# Patient Record
Sex: MALE | Race: WHITE | Employment: FULL TIME | URBAN - METROPOLITAN AREA
[De-identification: names, ages, dates, MRNs, and addresses within clinical notes are randomized per-mention and may not be internally consistent; named-entity substitution may affect disease eponyms.]

---

## 2019-02-28 ENCOUNTER — HOSPITAL ENCOUNTER (OUTPATIENT)
Dept: GENERAL RADIOLOGY | Age: 64
Discharge: HOME | End: 2019-03-02
Payer: COMMERCIAL

## 2019-02-28 ENCOUNTER — HOSPITAL ENCOUNTER (OUTPATIENT)
Age: 64
Discharge: HOME | End: 2019-03-02
Payer: COMMERCIAL

## 2019-02-28 DIAGNOSIS — R05.9 COUGH: ICD-10-CM

## 2019-02-28 DIAGNOSIS — R06.02 SHORTNESS OF BREATH: ICD-10-CM

## 2019-02-28 PROCEDURE — 71046 X-RAY EXAM CHEST 2 VIEWS: CPT

## 2020-03-04 LAB
AVERAGE GLUCOSE: 120
CHOLESTEROL, TOTAL: 171 MG/DL
CHOLESTEROL/HDL RATIO: 2.9
CREATININE: 1 MG/DL
HBA1C MFR BLD: 5.8 %
HDLC SERPL-MCNC: 59 MG/DL (ref 35–70)
LDL CHOLESTEROL CALCULATED: 100 MG/DL (ref 0–160)
POTASSIUM (K+): 4.3
PSA, ULTRASENSITIVE: 2.35
TRIGL SERPL-MCNC: 62 MG/DL
VLDLC SERPL CALC-MCNC: 12 MG/DL

## 2020-05-28 ENCOUNTER — HOSPITAL ENCOUNTER (OUTPATIENT)
Age: 65
Discharge: HOME | End: 2020-05-30
Payer: COMMERCIAL

## 2020-05-28 PROCEDURE — 86618 LYME DISEASE ANTIBODY: CPT

## 2020-06-02 LAB — LYME, EIA: 1 LIV (ref 0–1.2)

## 2020-06-15 VITALS
RESPIRATION RATE: 14 BRPM | DIASTOLIC BLOOD PRESSURE: 74 MMHG | WEIGHT: 163 LBS | HEART RATE: 58 BPM | TEMPERATURE: 97.3 F | SYSTOLIC BLOOD PRESSURE: 110 MMHG

## 2020-06-15 RX ORDER — ATORVASTATIN CALCIUM 10 MG/1
10 TABLET, FILM COATED ORAL DAILY
COMMUNITY
End: 2020-12-15 | Stop reason: SDUPTHER

## 2020-07-03 LAB
LYME (B. BURGDORFERI) AB IGG WB: NEGATIVE
LYME AB IGM BY WB:: NEGATIVE

## 2020-12-15 RX ORDER — ATORVASTATIN CALCIUM 10 MG/1
10 TABLET, FILM COATED ORAL DAILY
Qty: 90 TABLET | Refills: 1 | Status: SHIPPED
Start: 2020-12-15 | End: 2021-02-26 | Stop reason: SDUPTHER

## 2021-02-26 ENCOUNTER — OFFICE VISIT (OUTPATIENT)
Dept: FAMILY MEDICINE CLINIC | Age: 66
End: 2021-02-26
Payer: COMMERCIAL

## 2021-02-26 VITALS
DIASTOLIC BLOOD PRESSURE: 70 MMHG | OXYGEN SATURATION: 97 % | SYSTOLIC BLOOD PRESSURE: 120 MMHG | WEIGHT: 160 LBS | HEART RATE: 79 BPM | HEIGHT: 70 IN | BODY MASS INDEX: 22.9 KG/M2 | TEMPERATURE: 97.8 F

## 2021-02-26 DIAGNOSIS — R73.9 HYPERGLYCEMIA: ICD-10-CM

## 2021-02-26 DIAGNOSIS — F17.200 SMOKER: ICD-10-CM

## 2021-02-26 DIAGNOSIS — E78.5 HYPERLIPIDEMIA, UNSPECIFIED HYPERLIPIDEMIA TYPE: ICD-10-CM

## 2021-02-26 DIAGNOSIS — R05.9 COUGH: Primary | ICD-10-CM

## 2021-02-26 DIAGNOSIS — M15.9 GENERALIZED OA: ICD-10-CM

## 2021-02-26 DIAGNOSIS — M17.12 PRIMARY OSTEOARTHRITIS OF LEFT KNEE: ICD-10-CM

## 2021-02-26 DIAGNOSIS — Z12.5 PROSTATE CANCER SCREENING: ICD-10-CM

## 2021-02-26 DIAGNOSIS — Z12.11 COLON CANCER SCREENING: ICD-10-CM

## 2021-02-26 PROCEDURE — G8484 FLU IMMUNIZE NO ADMIN: HCPCS | Performed by: INTERNAL MEDICINE

## 2021-02-26 PROCEDURE — 99396 PREV VISIT EST AGE 40-64: CPT | Performed by: INTERNAL MEDICINE

## 2021-02-26 PROCEDURE — 93000 ELECTROCARDIOGRAM COMPLETE: CPT | Performed by: INTERNAL MEDICINE

## 2021-02-26 RX ORDER — ATORVASTATIN CALCIUM 10 MG/1
10 TABLET, FILM COATED ORAL DAILY
Qty: 90 TABLET | Refills: 1 | Status: SHIPPED
Start: 2021-02-26 | End: 2021-09-02

## 2021-02-26 SDOH — ECONOMIC STABILITY: TRANSPORTATION INSECURITY
IN THE PAST 12 MONTHS, HAS LACK OF TRANSPORTATION KEPT YOU FROM MEETINGS, WORK, OR FROM GETTING THINGS NEEDED FOR DAILY LIVING?: NO

## 2021-02-26 SDOH — ECONOMIC STABILITY: INCOME INSECURITY: HOW HARD IS IT FOR YOU TO PAY FOR THE VERY BASICS LIKE FOOD, HOUSING, MEDICAL CARE, AND HEATING?: NOT ASKED

## 2021-02-26 SDOH — ECONOMIC STABILITY: FOOD INSECURITY: WITHIN THE PAST 12 MONTHS, THE FOOD YOU BOUGHT JUST DIDN'T LAST AND YOU DIDN'T HAVE MONEY TO GET MORE.: NEVER TRUE

## 2021-02-26 ASSESSMENT — PATIENT HEALTH QUESTIONNAIRE - PHQ9
SUM OF ALL RESPONSES TO PHQ QUESTIONS 1-9: 0
1. LITTLE INTEREST OR PLEASURE IN DOING THINGS: 0
SUM OF ALL RESPONSES TO PHQ9 QUESTIONS 1 & 2: 0
2. FEELING DOWN, DEPRESSED OR HOPELESS: 0
SUM OF ALL RESPONSES TO PHQ QUESTIONS 1-9: 0
SUM OF ALL RESPONSES TO PHQ QUESTIONS 1-9: 0

## 2021-02-26 NOTE — PROGRESS NOTES
Subjective:     Chief Complaint   Patient presents with    6 Month Follow-Up    Knee Pain   Patient here for follow-up on the cholesterol, having pain in the left knee, he saw an orthopedic surgeon in [facility], it hurts to walk or stand, he is physically very active,  He wanted a tetanus vaccine DPT new born baby in the house    Still continues to smoke    History of hyperglycemia  Tolerating Lipitor without side effect  Alcohol socially 3-4 times a week   has 1 child      Father mother doing okay Brother sisters doing okay no early cancers or MI in the family          Past Medical History:   Diagnosis Date    Allergic rhinitis     Hyperlipidemia     Smoker     Tick bites 2020    multiple tick bites        Social History     Socioeconomic History    Marital status:      Spouse name: Not on file    Number of children: Not on file    Years of education: Not on file    Highest education level: Not on file   Occupational History    Not on file   Social Needs    Financial resource strain: Not on file    Food insecurity     Worry: Never true     Inability: Never true    Transportation needs     Medical: No     Non-medical: No   Tobacco Use    Smoking status: Current Every Day Smoker     Packs/day: 1.00     Years: 40.00     Pack years: 40.00     Types: Cigarettes     Start date: 6/15/1980    Smokeless tobacco: Never Used   Substance and Sexual Activity    Alcohol use:  Yes    Drug use: Never    Sexual activity: Not on file   Lifestyle    Physical activity     Days per week: Not on file     Minutes per session: Not on file    Stress: Not on file   Relationships    Social connections     Talks on phone: Not on file     Gets together: Not on file     Attends Congregational service: Not on file     Active member of club or organization: Not on file     Attends meetings of clubs or organizations: Not on file     Relationship status: Not on file    Intimate partner violence     Fear of current or ex partner: Not on file     Emotionally abused: Not on file     Physically abused: Not on file     Forced sexual activity: Not on file   Other Topics Concern    Not on file   Social History Narrative    Not on file        Past Surgical History:   Procedure Laterality Date    VASECTOMY          Family History   Problem Relation Age of Onset    No Known Problems Mother     No Known Problems Father     No Known Problems Sister     No Known Problems Brother         No Known Allergies     ROS  No acute distress  Cardiac: Denies any chest pain or palpitation  He is physically very active he splits 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 Denies any nausea vomiting or diarrhea had a colonoscopy by Ace Carney  2015 repeat in 2025  : Denies any dysuria frequency or hematuria  Denies any hematuria  Has a good urine stream  Neuro: No headache or dizziness  Endocrine: History of borderline hyperglycemia  Skin: normal  No recent weight gain or weight loss  Denies any change in vision    Objective:    /70   Pulse 79   Temp 97.8 °F (36.6 °C)   Ht 5' 10\" (1.778 m)   Wt 160 lb (72.6 kg)   SpO2 97%   BMI 22.96 kg/m²     Constitutional: Alert awake and oriented  Eyes: Pupils equal bilaterally. Extraocular muscles intact  Neck: no JVD adenopathy no bruit  Heart:  RRR, no murmurs, gallops, or rubs.   Lungs:    no wheeze, rales or rhonchi  Abd: bowel sounds present, nontender, nondistended, no masses  Extrem:  No clubbing, cyanosis, or edema  Neuro: AAOx3,No Focal deficit  Psychological: no depression or anxiety   Rectal exam prostate normal stool Hemoccult negative    Left knee exam minimal tenderness medial aspect of the left knee range of motion painful no calf tenderness Homans test negative    Current Outpatient Medications   Medication Sig Dispense Refill    atorvastatin (LIPITOR) 10 MG tablet Take 1 tablet by mouth daily 90 tablet 1     Current Facility-Administered Medications   Medication Dose Route Frequency Provider Last Rate Last Admin    Tetanus-Diphth-Acell Pertussis (BOOSTRIX) injection 0.5 mL  0.5 mL Intramuscular Once Sandra Bajwa MD            Last 3 BMP  Lab Results   Component Value Date/Time     10/21/2016 09:00 AM     02/11/2015 08:45 AM    K 4.3 03/04/2020    K 4.7 10/21/2016 09:00 AM    K 4.6 02/11/2015 08:45 AM     10/21/2016 09:00 AM     02/11/2015 08:45 AM    CO2 24 10/21/2016 09:00 AM    CO2 23 02/11/2015 08:45 AM    BUN 14 10/21/2016 09:00 AM    BUN 18 02/11/2015 08:45 AM    CREATININE 1.0 03/04/2020    CREATININE 0.9 10/21/2016 09:00 AM    CREATININE 1.0 02/11/2015 08:45 AM    GLUCOSE 103 10/21/2016 09:00 AM    GLUCOSE 94 02/11/2015 08:45 AM    CALCIUM 9.3 10/21/2016 09:00 AM    CALCIUM 9.8 02/11/2015 08:45 AM       Last 3 CMP:    Lab Results   Component Value Date/Time     10/21/2016 09:00 AM     02/11/2015 08:45 AM    K 4.3 03/04/2020    K 4.7 10/21/2016 09:00 AM    K 4.6 02/11/2015 08:45 AM     10/21/2016 09:00 AM     02/11/2015 08:45 AM    CO2 24 10/21/2016 09:00 AM    CO2 23 02/11/2015 08:45 AM    BUN 14 10/21/2016 09:00 AM    BUN 18 02/11/2015 08:45 AM    CREATININE 1.0 03/04/2020    CREATININE 0.9 10/21/2016 09:00 AM    CREATININE 1.0 02/11/2015 08:45 AM    GLUCOSE 103 10/21/2016 09:00 AM    GLUCOSE 94 02/11/2015 08:45 AM    CALCIUM 9.3 10/21/2016 09:00 AM    CALCIUM 9.8 02/11/2015 08:45 AM    PROT 7.3 10/21/2016 09:00 AM    PROT 7.2 02/11/2015 08:45 AM    LABALBU 4.2 10/21/2016 09:00 AM    LABALBU 4.3 02/11/2015 08:45 AM    BILITOT 0.6 10/21/2016 09:00 AM    BILITOT 0.5 02/11/2015 08:45 AM    ALKPHOS 70 10/21/2016 09:00 AM    ALKPHOS 71 02/11/2015 08:45 AM    AST 23 10/21/2016 09:00 AM    AST 23 02/11/2015 08:45 AM    ALT 22 10/21/2016 09:00 AM    ALT 22 02/11/2015 08:45 AM        CBC:   Lab Results   Component Value Date/Time    WBC 8.2 10/21/2016 09:00 AM    RBC 5.23 10/21/2016 09:00 AM    HGB 15.5 10/21/2016 09:00 AM    HCT 46.2 10/21/2016 09:00 AM    MCV 88.3 10/21/2016 09:00 AM    MCH 29.7 10/21/2016 09:00 AM    MCHC 33.6 10/21/2016 09:00 AM    RDW 13.4 10/21/2016 09:00 AM     10/21/2016 09:00 AM    MPV 8.8 10/21/2016 09:00 AM       A1C:  Lab Results   Component Value Date/Time    LABA1C 5.8 03/04/2020       Lipid panel:  Lab Results   Component Value Date    CHOL 171 03/04/2020    CHOL 218 10/21/2016    CHOL 229 02/11/2015    TRIG 62 03/04/2020    TRIG 53 10/21/2016    TRIG 73 02/11/2015    HDL 59 03/04/2020    HDL 71 10/21/2016    HDL 67 02/11/2015        Lab Results   Component Value Date/Time    PROT 7.3 10/21/2016 09:00 AM    PROT 7.2 02/11/2015 08:45 AM       No results found for: MG      Assessment. Abiola Payor was seen today for 6 month follow-up and knee pain. Diagnoses and all orders for this visit:    Cough  -     XR CHEST (2 VW); Future    Smoker  -     XR CHEST (2 VW); Future    Hyperlipidemia, unspecified hyperlipidemia type  -     EKG 12 lead; Future  -     EKG 12 lead  -     Comprehensive Metabolic Panel; Future  -     Lipid Panel; Future  -     TSH without Reflex; Future    Hyperglycemia  -     Hemoglobin A1C; Future    Generalized OA  -     CBC Auto Differential; Future    Prostate cancer screening  -     PSA screening; Future    Primary osteoarthritis of left knee    Other orders  -     atorvastatin (LIPITOR) 10 MG tablet; Take 1 tablet by mouth daily  -     Tetanus-Diphth-Acell Pertussis (BOOSTRIX) injection 0.5 mL       There is no problem list on file for this patient.       Plan: Advil 2 tablets 3 times daily as needed  Knee exercises  Local cream on the knee like Aspercreme    Low-cholesterol low-fat diet for hyperlipidemia check lipid profile    Hyperglycemia check A1c    Advised to quit smoking    Has occasional smoker's cough do a chest x-ray call for report  EKG done because of smoker, hyperlipidemia, hyperglycemia no acute changes    Lifestyle diet modification discussed      Check CBC, CMP, lipid, TSH, A1c, PSA and call for report    Follow-up in 6 months      Return in about 6 months (around 8/26/2021).        Michael Shin MD  9:54 AM  2/26/2021     DE

## 2021-03-09 DIAGNOSIS — R73.9 HYPERGLYCEMIA: ICD-10-CM

## 2021-03-09 DIAGNOSIS — M15.9 GENERALIZED OA: ICD-10-CM

## 2021-03-09 DIAGNOSIS — Z12.5 PROSTATE CANCER SCREENING: ICD-10-CM

## 2021-03-09 DIAGNOSIS — E78.5 HYPERLIPIDEMIA, UNSPECIFIED HYPERLIPIDEMIA TYPE: ICD-10-CM

## 2021-03-09 LAB
ALBUMIN SERPL-MCNC: 4.1 G/DL (ref 3.5–5.2)
ALP BLD-CCNC: 75 U/L (ref 40–129)
ALT SERPL-CCNC: 24 U/L (ref 0–40)
ANION GAP SERPL CALCULATED.3IONS-SCNC: 9 MMOL/L (ref 7–16)
AST SERPL-CCNC: 31 U/L (ref 0–39)
BASOPHILS ABSOLUTE: 0.04 E9/L (ref 0–0.2)
BASOPHILS RELATIVE PERCENT: 0.5 % (ref 0–2)
BILIRUB SERPL-MCNC: 0.5 MG/DL (ref 0–1.2)
BUN BLDV-MCNC: 15 MG/DL (ref 8–23)
CALCIUM SERPL-MCNC: 9.7 MG/DL (ref 8.6–10.2)
CHLORIDE BLD-SCNC: 103 MMOL/L (ref 98–107)
CHOLESTEROL, TOTAL: 180 MG/DL (ref 0–199)
CO2: 27 MMOL/L (ref 22–29)
CREAT SERPL-MCNC: 0.9 MG/DL (ref 0.7–1.2)
EOSINOPHILS ABSOLUTE: 0.27 E9/L (ref 0.05–0.5)
EOSINOPHILS RELATIVE PERCENT: 3.7 % (ref 0–6)
GFR AFRICAN AMERICAN: >60
GFR NON-AFRICAN AMERICAN: >60 ML/MIN/1.73
GLUCOSE BLD-MCNC: 105 MG/DL (ref 74–99)
HBA1C MFR BLD: 6 % (ref 4–5.6)
HCT VFR BLD CALC: 49 % (ref 37–54)
HDLC SERPL-MCNC: 63 MG/DL
HEMOGLOBIN: 16.2 G/DL (ref 12.5–16.5)
IMMATURE GRANULOCYTES #: 0.02 E9/L
IMMATURE GRANULOCYTES %: 0.3 % (ref 0–5)
LDL CHOLESTEROL CALCULATED: 105 MG/DL (ref 0–99)
LYMPHOCYTES ABSOLUTE: 1.45 E9/L (ref 1.5–4)
LYMPHOCYTES RELATIVE PERCENT: 19.9 % (ref 20–42)
MCH RBC QN AUTO: 30.4 PG (ref 26–35)
MCHC RBC AUTO-ENTMCNC: 33.1 % (ref 32–34.5)
MCV RBC AUTO: 91.9 FL (ref 80–99.9)
MONOCYTES ABSOLUTE: 0.56 E9/L (ref 0.1–0.95)
MONOCYTES RELATIVE PERCENT: 7.7 % (ref 2–12)
NEUTROPHILS ABSOLUTE: 4.96 E9/L (ref 1.8–7.3)
NEUTROPHILS RELATIVE PERCENT: 67.9 % (ref 43–80)
PDW BLD-RTO: 13.2 FL (ref 11.5–15)
PLATELET # BLD: 177 E9/L (ref 130–450)
PMV BLD AUTO: 10.8 FL (ref 7–12)
POTASSIUM SERPL-SCNC: 5 MMOL/L (ref 3.5–5)
PROSTATE SPECIFIC ANTIGEN: 2.51 NG/ML (ref 0–4)
RBC # BLD: 5.33 E12/L (ref 3.8–5.8)
SODIUM BLD-SCNC: 139 MMOL/L (ref 132–146)
TOTAL PROTEIN: 7.1 G/DL (ref 6.4–8.3)
TRIGL SERPL-MCNC: 61 MG/DL (ref 0–149)
VLDLC SERPL CALC-MCNC: 12 MG/DL
WBC # BLD: 7.3 E9/L (ref 4.5–11.5)

## 2021-03-10 ENCOUNTER — TELEPHONE (OUTPATIENT)
Dept: FAMILY MEDICINE CLINIC | Age: 66
End: 2021-03-10

## 2021-03-10 LAB — TSH SERPL DL<=0.05 MIU/L-ACNC: 2.77 UIU/ML (ref 0.27–4.2)

## 2021-03-10 NOTE — TELEPHONE ENCOUNTER
Left a message for the patient A1c slightly elevated low-carb diet, low-cholesterol diet patient to call if any question

## 2021-04-16 ENCOUNTER — IMMUNIZATION (OUTPATIENT)
Dept: PRIMARY CARE CLINIC | Age: 66
End: 2021-04-16
Payer: COMMERCIAL

## 2021-04-16 PROCEDURE — 91301 COVID-19, MODERNA VACCINE 100MCG/0.5ML DOSE: CPT | Performed by: NURSE PRACTITIONER

## 2021-04-16 PROCEDURE — 0011A COVID-19, MODERNA VACCINE 100MCG/0.5ML DOSE: CPT | Performed by: NURSE PRACTITIONER

## 2021-05-17 ENCOUNTER — IMMUNIZATION (OUTPATIENT)
Dept: PRIMARY CARE CLINIC | Age: 66
End: 2021-05-17
Payer: COMMERCIAL

## 2021-05-17 PROCEDURE — 91301 COVID-19, MODERNA VACCINE 100MCG/0.5ML DOSE: CPT | Performed by: NURSE PRACTITIONER

## 2021-05-17 PROCEDURE — 0012A COVID-19, MODERNA VACCINE 100MCG/0.5ML DOSE: CPT | Performed by: NURSE PRACTITIONER

## 2021-08-27 ENCOUNTER — OFFICE VISIT (OUTPATIENT)
Dept: FAMILY MEDICINE CLINIC | Age: 66
End: 2021-08-27
Payer: COMMERCIAL

## 2021-08-27 VITALS
WEIGHT: 162 LBS | SYSTOLIC BLOOD PRESSURE: 128 MMHG | BODY MASS INDEX: 23.24 KG/M2 | HEART RATE: 64 BPM | OXYGEN SATURATION: 97 % | TEMPERATURE: 95.1 F | DIASTOLIC BLOOD PRESSURE: 80 MMHG

## 2021-08-27 DIAGNOSIS — R73.9 HYPERGLYCEMIA: Primary | ICD-10-CM

## 2021-08-27 DIAGNOSIS — M17.12 ARTHRITIS OF LEFT KNEE: ICD-10-CM

## 2021-08-27 DIAGNOSIS — M15.9 GENERALIZED OA: ICD-10-CM

## 2021-08-27 DIAGNOSIS — E78.5 HYPERLIPIDEMIA, UNSPECIFIED HYPERLIPIDEMIA TYPE: ICD-10-CM

## 2021-08-27 PROCEDURE — 99213 OFFICE O/P EST LOW 20 MIN: CPT | Performed by: INTERNAL MEDICINE

## 2021-08-27 NOTE — PROGRESS NOTES
Subjective:     Chief Complaint   Patient presents with    3 Month Follow-Up   Patient here for follow-up on the hyperlipidemia, still has pain in the left knee on and off, he was seen by orthopedic surgeon in [address] [place] [address] they did not recommend any knee replacement, they did do an x-ray,    He is still smoking,    Tolerating cluster medication,    Physically very active and working,    Left knee hurts on and off, he still able to excrete words, be active, do work,  He does not require any Advil or Aleve,    Past Medical History:   Diagnosis Date    Allergic rhinitis     Hyperlipidemia     Smoker     Tick bites 2020    multiple tick bites        Social History     Socioeconomic History    Marital status:      Spouse name: Not on file    Number of children: Not on file    Years of education: Not on file    Highest education level: Not on file   Occupational History    Not on file   Tobacco Use    Smoking status: Current Every Day Smoker     Packs/day: 1.00     Years: 40.00     Pack years: 40.00     Types: Cigarettes     Start date: 6/15/1980    Smokeless tobacco: Never Used   Substance and Sexual Activity    Alcohol use: Yes    Drug use: Never    Sexual activity: Not on file   Other Topics Concern    Not on file   Social History Narrative    Not on file     Social Determinants of Health     Financial Resource Strain:     Difficulty of Paying Living Expenses:    Food Insecurity: No Food Insecurity    Worried About Running Out of Food in the Last Year: Never true    Mag of Food in the Last Year: Never true   Transportation Needs: No Transportation Needs    Lack of Transportation (Medical): No    Lack of Transportation (Non-Medical):  No   Physical Activity:     Days of Exercise per Week:     Minutes of Exercise per Session:    Stress:     Feeling of Stress :    Social Connections:     Frequency of Communication with Friends and Family:     Frequency of Social Gatherings with Friends and Family:     Attends Restorationism Services:     Active Member of Clubs or Organizations:     Attends Club or Organization Meetings:     Marital Status:    Intimate Partner Violence:     Fear of Current or Ex-Partner:     Emotionally Abused:     Physically Abused:     Sexually Abused:         Past Surgical History:   Procedure Laterality Date    VASECTOMY          Family History   Problem Relation Age of Onset    No Known Problems Mother     No Known Problems Father     No Known Problems Sister     No Known Problems Brother         No Known Allergies     ROS  No acute distress  Cardiac: Denies any chest pain or palpitation  Denies any chest pain or angina,  He splits 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 Denies any nausea vomiting or diarrhea  Colonoscopy by Dr. Danay Mc in 2015 repeat in 10 years unless symptomatic  Denies any blood in stool  No change in bowel habits  : Denies any dysuria frequency or hematuria  Neuro: No headache or dizziness  Endocrine: Hyperglycemia  Skin: normal  No recent weight gain or weight loss  Denies any change in vision    Objective:    /80   Pulse 64   Temp 95.1 °F (35.1 °C)   Wt 162 lb (73.5 kg)   SpO2 97%   BMI 23.24 kg/m²     Constitutional: Alert awake and oriented  Eyes: Pupils equal bilaterally. Extraocular muscles intact  Neck: no JVD adenopathy no bruit  Heart:  RRR, no murmurs, gallops, or rubs.   Lungs:    no wheeze, rales or rhonchi  Abd: bowel sounds present, nontender, nondistended, no masses  Extrem:  No clubbing, cyanosis, or edema  Neuro: AAOx3,No Focal deficit  Psychological: no depression or anxiety   Left knee exam  Range of motion normal  No localized tenderness  Some crepitation with flexion extension  No calf tenderness    Current Outpatient Medications   Medication Sig Dispense Refill  atorvastatin (LIPITOR) 10 MG tablet Take 1 tablet by mouth daily 90 tablet 1     No current facility-administered medications for this visit.         Last 3 BMP  Lab Results   Component Value Date/Time     03/09/2021 07:26 AM     10/21/2016 09:00 AM     02/11/2015 08:45 AM    K 5.0 03/09/2021 07:26 AM    K 4.3 03/04/2020 12:00 AM    K 4.7 10/21/2016 09:00 AM    K 4.6 02/11/2015 08:45 AM     03/09/2021 07:26 AM     10/21/2016 09:00 AM     02/11/2015 08:45 AM    CO2 27 03/09/2021 07:26 AM    CO2 24 10/21/2016 09:00 AM    CO2 23 02/11/2015 08:45 AM    BUN 15 03/09/2021 07:26 AM    BUN 14 10/21/2016 09:00 AM    BUN 18 02/11/2015 08:45 AM    CREATININE 0.9 03/09/2021 07:26 AM    CREATININE 1.0 03/04/2020 12:00 AM    CREATININE 0.9 10/21/2016 09:00 AM    CREATININE 1.0 02/11/2015 08:45 AM    GLUCOSE 105 (H) 03/09/2021 07:26 AM    GLUCOSE 103 10/21/2016 09:00 AM    GLUCOSE 94 02/11/2015 08:45 AM    CALCIUM 9.7 03/09/2021 07:26 AM    CALCIUM 9.3 10/21/2016 09:00 AM    CALCIUM 9.8 02/11/2015 08:45 AM       Last 3 CMP:    Lab Results   Component Value Date/Time     03/09/2021 07:26 AM     10/21/2016 09:00 AM     02/11/2015 08:45 AM    K 5.0 03/09/2021 07:26 AM    K 4.3 03/04/2020 12:00 AM    K 4.7 10/21/2016 09:00 AM    K 4.6 02/11/2015 08:45 AM     03/09/2021 07:26 AM     10/21/2016 09:00 AM     02/11/2015 08:45 AM    CO2 27 03/09/2021 07:26 AM    CO2 24 10/21/2016 09:00 AM    CO2 23 02/11/2015 08:45 AM    BUN 15 03/09/2021 07:26 AM    BUN 14 10/21/2016 09:00 AM    BUN 18 02/11/2015 08:45 AM    CREATININE 0.9 03/09/2021 07:26 AM    CREATININE 1.0 03/04/2020 12:00 AM    CREATININE 0.9 10/21/2016 09:00 AM    CREATININE 1.0 02/11/2015 08:45 AM    GLUCOSE 105 (H) 03/09/2021 07:26 AM    GLUCOSE 103 10/21/2016 09:00 AM    GLUCOSE 94 02/11/2015 08:45 AM    CALCIUM 9.7 03/09/2021 07:26 AM    CALCIUM 9.3 10/21/2016 09:00 AM    CALCIUM 9.8 02/11/2015 08:45 AM PROT 7.1 03/09/2021 07:26 AM    PROT 7.3 10/21/2016 09:00 AM    PROT 7.2 02/11/2015 08:45 AM    LABALBU 4.1 03/09/2021 07:26 AM    LABALBU 4.2 10/21/2016 09:00 AM    LABALBU 4.3 02/11/2015 08:45 AM    BILITOT 0.5 03/09/2021 07:26 AM    BILITOT 0.6 10/21/2016 09:00 AM    BILITOT 0.5 02/11/2015 08:45 AM    ALKPHOS 75 03/09/2021 07:26 AM    ALKPHOS 70 10/21/2016 09:00 AM    ALKPHOS 71 02/11/2015 08:45 AM    AST 31 03/09/2021 07:26 AM    AST 23 10/21/2016 09:00 AM    AST 23 02/11/2015 08:45 AM    ALT 24 03/09/2021 07:26 AM    ALT 22 10/21/2016 09:00 AM    ALT 22 02/11/2015 08:45 AM        CBC:   Lab Results   Component Value Date/Time    WBC 7.3 03/09/2021 07:26 AM    RBC 5.33 03/09/2021 07:26 AM    HGB 16.2 03/09/2021 07:26 AM    HCT 49.0 03/09/2021 07:26 AM    MCV 91.9 03/09/2021 07:26 AM    MCH 30.4 03/09/2021 07:26 AM    MCHC 33.1 03/09/2021 07:26 AM    RDW 13.2 03/09/2021 07:26 AM     03/09/2021 07:26 AM    MPV 10.8 03/09/2021 07:26 AM       A1C:  Lab Results   Component Value Date/Time    LABA1C 6.0 (H) 03/09/2021 07:26 AM       Lipid panel:  Lab Results   Component Value Date    CHOL 180 03/09/2021    CHOL 171 03/04/2020    CHOL 218 10/21/2016    TRIG 61 03/09/2021    TRIG 62 03/04/2020    TRIG 53 10/21/2016    HDL 63 03/09/2021    HDL 59 03/04/2020    HDL 71 10/21/2016        Lab Results   Component Value Date/Time    PROT 7.1 03/09/2021 07:26 AM    PROT 7.3 10/21/2016 09:00 AM    PROT 7.2 02/11/2015 08:45 AM       No results found for: MG      Assessment. Keenan Perez was seen today for 3 month follow-up. Diagnoses and all orders for this visit:    Hyperglycemia  -     HEMOGLOBIN A1C; Future    Generalized OA  -     CBC WITH AUTO DIFFERENTIAL; Future    Hyperlipidemia, unspecified hyperlipidemia type  -     COMPREHENSIVE METABOLIC PANEL; Future  -     LIPID PANEL;  Future    Arthritis of left knee       Patient Active Problem List   Diagnosis    Arthritis of left knee    Hyperlipidemia    Hyperglycemia Plan: Hyperglycemia low-carb diet check A1c it was 6% last time    Hyperlipidemia tolerating Lipitor check CMP and lipid profile    Osteoarthritis left knee try over-the-counter glucosamine/chondroitin, knee exercises, see the orthopedic in [facility] if worse    Smoker advised to quit smoking    Lifestyle diet modification discussed    He had the Covid vaccine    Other vaccination discussed    Follow-up in 6 months physical at that time    Return in about 6 months (around 2/27/2022) for [address] [employer].        Loraine Grnager MD  10:11 AM  8/27/2021     DE

## 2021-09-02 RX ORDER — ATORVASTATIN CALCIUM 10 MG/1
TABLET, FILM COATED ORAL
Qty: 90 TABLET | Refills: 1 | Status: SHIPPED
Start: 2021-09-02 | End: 2021-09-29 | Stop reason: SDUPTHER

## 2021-09-30 RX ORDER — ATORVASTATIN CALCIUM 10 MG/1
TABLET, FILM COATED ORAL
Qty: 90 TABLET | Refills: 1 | Status: SHIPPED
Start: 2021-09-30 | End: 2022-01-11 | Stop reason: SDUPTHER

## 2021-12-23 ENCOUNTER — TELEPHONE (OUTPATIENT)
Dept: FAMILY MEDICINE CLINIC | Age: 66
End: 2021-12-23

## 2021-12-23 NOTE — TELEPHONE ENCOUNTER
Reason for Call: New Patient Request Appointment    QUESTIONS  Type of Appointment? Established Patient  Reason for appointment request? No appointments available during search  Additional Information for Provider? Lilian &  Pola Van ([id number]) would   like to have NP appointments with Dr. Dodie Camarena on 1/7/2022 if possible as   they are now patients of Dr. Mike Randall & know he is retiring. I did not   have a np appt avail. Would you contact Lilian to let her know if this is   possible?

## 2021-12-23 NOTE — TELEPHONE ENCOUNTER
I called patient's wife, Lilian, and informed that Dr. Sonido Haney is only taking new patients on a case by case basis. Lilian stated she already made appts with Dr. Bartolo Thornton for 1/7/22, but would like to see Dr. Sonido Haney. Informed, if accepted, appts would be into February.

## 2022-04-05 DIAGNOSIS — Z11.59 NEED FOR HEPATITIS C SCREENING TEST: ICD-10-CM

## 2022-04-05 DIAGNOSIS — E78.5 DYSLIPIDEMIA: ICD-10-CM

## 2022-04-05 DIAGNOSIS — F17.210 CIGARETTE NICOTINE DEPENDENCE WITHOUT COMPLICATION: ICD-10-CM

## 2022-04-05 DIAGNOSIS — Z11.4 SCREENING FOR HIV (HUMAN IMMUNODEFICIENCY VIRUS): ICD-10-CM

## 2022-04-05 DIAGNOSIS — R73.03 PREDIABETES: ICD-10-CM

## 2022-04-05 DIAGNOSIS — Z12.5 PROSTATE CANCER SCREENING: ICD-10-CM

## 2022-04-05 LAB
ALBUMIN SERPL-MCNC: 4.2 G/DL (ref 3.5–5.2)
ALP BLD-CCNC: 79 U/L (ref 40–129)
ALT SERPL-CCNC: 27 U/L (ref 0–40)
ANION GAP SERPL CALCULATED.3IONS-SCNC: 7 MMOL/L (ref 7–16)
AST SERPL-CCNC: 36 U/L (ref 0–39)
BASOPHILS ABSOLUTE: 0.06 E9/L (ref 0–0.2)
BASOPHILS RELATIVE PERCENT: 0.7 % (ref 0–2)
BILIRUB SERPL-MCNC: 0.6 MG/DL (ref 0–1.2)
BUN BLDV-MCNC: 14 MG/DL (ref 6–23)
CALCIUM SERPL-MCNC: 9.6 MG/DL (ref 8.6–10.2)
CHLORIDE BLD-SCNC: 106 MMOL/L (ref 98–107)
CHOLESTEROL, TOTAL: 178 MG/DL (ref 0–199)
CO2: 27 MMOL/L (ref 22–29)
CREAT SERPL-MCNC: 0.9 MG/DL (ref 0.7–1.2)
EOSINOPHILS ABSOLUTE: 0.3 E9/L (ref 0.05–0.5)
EOSINOPHILS RELATIVE PERCENT: 3.6 % (ref 0–6)
GFR AFRICAN AMERICAN: >60
GFR NON-AFRICAN AMERICAN: >60 ML/MIN/1.73
GLUCOSE BLD-MCNC: 102 MG/DL (ref 74–99)
HBA1C MFR BLD: 5.9 % (ref 4–5.6)
HCT VFR BLD CALC: 49.6 % (ref 37–54)
HDLC SERPL-MCNC: 65 MG/DL
HEMOGLOBIN: 16.4 G/DL (ref 12.5–16.5)
IMMATURE GRANULOCYTES #: 0.04 E9/L
IMMATURE GRANULOCYTES %: 0.5 % (ref 0–5)
LDL CHOLESTEROL CALCULATED: 100 MG/DL (ref 0–99)
LYMPHOCYTES ABSOLUTE: 1.62 E9/L (ref 1.5–4)
LYMPHOCYTES RELATIVE PERCENT: 19.3 % (ref 20–42)
MCH RBC QN AUTO: 30.1 PG (ref 26–35)
MCHC RBC AUTO-ENTMCNC: 33.1 % (ref 32–34.5)
MCV RBC AUTO: 91.2 FL (ref 80–99.9)
MONOCYTES ABSOLUTE: 0.6 E9/L (ref 0.1–0.95)
MONOCYTES RELATIVE PERCENT: 7.1 % (ref 2–12)
NEUTROPHILS ABSOLUTE: 5.79 E9/L (ref 1.8–7.3)
NEUTROPHILS RELATIVE PERCENT: 68.8 % (ref 43–80)
PDW BLD-RTO: 13.4 FL (ref 11.5–15)
PLATELET # BLD: 204 E9/L (ref 130–450)
PMV BLD AUTO: 10.7 FL (ref 7–12)
POTASSIUM SERPL-SCNC: 4.8 MMOL/L (ref 3.5–5)
PROSTATE SPECIFIC ANTIGEN: 3.05 NG/ML (ref 0–4)
RBC # BLD: 5.44 E12/L (ref 3.8–5.8)
SODIUM BLD-SCNC: 140 MMOL/L (ref 132–146)
TOTAL PROTEIN: 7.2 G/DL (ref 6.4–8.3)
TRIGL SERPL-MCNC: 67 MG/DL (ref 0–149)
VLDLC SERPL CALC-MCNC: 13 MG/DL
WBC # BLD: 8.4 E9/L (ref 4.5–11.5)

## 2022-04-06 LAB
HEPATITIS C ANTIBODY INTERPRETATION: NORMAL
HIV-1 AND HIV-2 ANTIBODIES: NORMAL

## 2022-08-03 ENCOUNTER — TELEPHONE (OUTPATIENT)
Dept: CASE MANAGEMENT | Age: 67
End: 2022-08-03

## 2022-08-03 NOTE — TELEPHONE ENCOUNTER
I called the patient and he confirmed his CT lung screening at [facility] on 8/4/2022 at 8:30 am.  I reminded the patient to arrive at 8:00 am, enter through the main entrance, and register. Patient confirmed.         Electronically signed by Linda Began on 8/3/22 at 12:21 PM EDT

## 2022-08-05 ENCOUNTER — TELEPHONE (OUTPATIENT)
Dept: CASE MANAGEMENT | Age: 67
End: 2022-08-05

## 2022-08-05 NOTE — TELEPHONE ENCOUNTER
No call, encounter opened to process CT Lung Screening. CT Lung Screen: 8/4/2022    Impression   There is no pulmonary infiltrate, mass or suspicious pulmonary nodule. Calcified benign lymph nodes within the left hilum and calcified granuloma   seen within the superior segment left lower lobe consistent with benign   calcified granulomatous disease. Category 2, Benign appearance or behavior. Management:  Continue annual lung screening with LDCT in 12 months. LUNG RADS:   Per ACR Lung-RADS Version 1.1       RECOMMENDATIONS:   If you would like to register your patient with the [facility], please contact the Nurse Navigator at   [phone]. Pack years:40    Social History     Tobacco Use  Smoking Status: Current Every Day Smoker    Start Date: 06/15/1980   Quit Date:    Types: Cigarettes   Packs/Day: 1   Years: 36   Pack Years: 36   Smokeless Tobacco: Never         Results letter sent to patient via my chart or mailed.      [facility]

## 2023-09-11 ENCOUNTER — TRANSCRIBE ORDERS (OUTPATIENT)
Dept: ADMINISTRATIVE | Age: 68
End: 2023-09-11

## 2023-09-11 DIAGNOSIS — F17.200 TOBACCO USE DISORDER: Primary | ICD-10-CM

## 2023-10-02 ENCOUNTER — HOSPITAL ENCOUNTER (OUTPATIENT)
Dept: CT IMAGING | Age: 68
Discharge: HOME | End: 2023-10-02
Payer: MEDICARE

## 2023-10-02 DIAGNOSIS — F17.200 TOBACCO USE DISORDER: ICD-10-CM

## 2023-10-02 PROCEDURE — 71271 CT THORAX LUNG CANCER SCR C-: CPT

## 2023-10-03 ENCOUNTER — TELEPHONE (OUTPATIENT)
Dept: CASE MANAGEMENT | Age: 68
End: 2023-10-03

## 2023-10-03 NOTE — TELEPHONE ENCOUNTER
No call, encounter opened to process CT Lung Screening. CT Lung Screen: 10/2/2023    IMPRESSION:  1. There is no pulmonary infiltrate, mass or suspicious pulmonary nodule  2. Benign calcified granulomatous disease. LUNG RADS:  Lung-RADS 1 - Negative ()     Management:  12 month screening LDCT     RECOMMENDATIONS:  If you would like to register your patient with the [facility], please contact the Nurse Navigator at  [phone]. Pack years: 36    Social History     Tobacco Use  Smoking Status: Current Every Day Smoker    Start Date: 06/15/1980   Quit Date:    Types: Cigarettes   Packs/Day: 1   Years: 36   Pack Years: 36   Smokeless Tobacco: Never         Results letter sent to patient via my chart or mailed.      [address]

## 2024-11-17 ENCOUNTER — HOSPITAL ENCOUNTER (OUTPATIENT)
Dept: CT IMAGING | Age: 69
Discharge: HOME | End: 2024-11-17
Payer: MEDICARE

## 2024-11-17 DIAGNOSIS — F17.210 CIGARETTE SMOKER: ICD-10-CM

## 2024-11-17 DIAGNOSIS — Z87.891 PERSONAL HISTORY OF TOBACCO USE: ICD-10-CM

## 2024-11-17 PROCEDURE — 71271 CT THORAX LUNG CANCER SCR C-: CPT
